# Patient Record
Sex: FEMALE | Race: WHITE | Employment: FULL TIME | URBAN - NONMETROPOLITAN AREA
[De-identification: names, ages, dates, MRNs, and addresses within clinical notes are randomized per-mention and may not be internally consistent; named-entity substitution may affect disease eponyms.]

---

## 2021-06-23 ENCOUNTER — TELEPHONE (OUTPATIENT)
Dept: OBGYN CLINIC | Age: 41
End: 2021-06-23

## 2021-06-28 RX ORDER — ACETAMINOPHEN AND CODEINE PHOSPHATE 120; 12 MG/5ML; MG/5ML
1 SOLUTION ORAL DAILY
Qty: 28 TABLET | Refills: 0 | Status: SHIPPED | OUTPATIENT
Start: 2021-06-28 | End: 2021-07-06

## 2021-07-06 ENCOUNTER — OFFICE VISIT (OUTPATIENT)
Dept: OBGYN CLINIC | Age: 41
End: 2021-07-06
Payer: COMMERCIAL

## 2021-07-06 VITALS
DIASTOLIC BLOOD PRESSURE: 64 MMHG | WEIGHT: 127.4 LBS | HEIGHT: 62 IN | BODY MASS INDEX: 23.45 KG/M2 | SYSTOLIC BLOOD PRESSURE: 97 MMHG

## 2021-07-06 DIAGNOSIS — Z01.411 ABNORMAL FEMALE PELVIC EXAM: Primary | ICD-10-CM

## 2021-07-06 DIAGNOSIS — N92.6 MENSTRUAL CHANGES: ICD-10-CM

## 2021-07-06 PROCEDURE — 99396 PREV VISIT EST AGE 40-64: CPT | Performed by: ADVANCED PRACTICE MIDWIFE

## 2021-07-06 RX ORDER — LEVOTHYROXINE SODIUM 75 UG/1
TABLET ORAL
COMMUNITY
Start: 2021-06-23

## 2021-07-06 RX ORDER — ACETAMINOPHEN AND CODEINE PHOSPHATE 120; 12 MG/5ML; MG/5ML
1 SOLUTION ORAL DAILY
Qty: 28 TABLET | Refills: 12 | Status: SHIPPED | OUTPATIENT
Start: 2021-07-06 | End: 2022-07-11

## 2021-07-06 ASSESSMENT — ENCOUNTER SYMPTOMS
CONSTIPATION: 0
SHORTNESS OF BREATH: 0
ABDOMINAL PAIN: 0
RESPIRATORY NEGATIVE: 1
GASTROINTESTINAL NEGATIVE: 1
DIARRHEA: 0

## 2021-07-06 NOTE — PROGRESS NOTES
YEARLY PHYSICAL    Date of service: 2021    Shelley Alonso  Is a 41 y.o.   female    PT's PCP is: No primary care provider on file. : 1980                                             Subjective:       Patient's last menstrual period was 2021 (exact date). Are your menses regular: Does not have menses with Shruti at end of pill pack (does have PMS type s/s) but daughter started menses approx 6 months ago (irregular cycles) and when daughter has menses patient has spotting. OB History    Para Term  AB Living   2         2   SAB TAB Ectopic Molar Multiple Live Births             2      # Outcome Date GA Lbr Perico/2nd Weight Sex Delivery Anes PTL Lv   2             1                  Social History     Tobacco Use   Smoking Status Former Smoker    Types: Cigarettes   Smokeless Tobacco Never Used        Social History     Substance and Sexual Activity   Alcohol Use Yes    Comment: rare       Family History   Problem Relation Age of Onset    Breast Cancer Paternal Grandmother     Hypertension Maternal Grandmother     Hypertension Mother        Any family history of breast or ovarian cancer:  Yes    Any family history of blood clots: No      Allergies: Azithromycin, Ibuprofen, Penicillins, and Sulfa antibiotics      Current Outpatient Medications:     EUTHYROX 75 MCG tablet, , Disp: , Rfl:     norethindrone (ORTHO MICRONOR) 0.35 MG tablet, Take 1 tablet by mouth daily, Disp: 28 tablet, Rfl: 0    Social History     Substance and Sexual Activity   Sexual Activity Yes    Partners: Male    Birth control/protection: OCP       Any bleeding or pain with intercourse: No    Last Yearly:      Last pap: 2019 - normal    Last HPV: 2019 - negative    Last Mammogram: 2020 - normal    Do you do self breast exams: Yes    Past Medical History:   Diagnosis Date    Abnormal Pap smear of cervix       Thyroid disease     hypothyroidism       History reviewed. No pertinent surgical history. Family History   Problem Relation Age of Onset    Breast Cancer Paternal Grandmother     Hypertension Maternal Grandmother     Hypertension Mother        Chief Complaint   Patient presents with    Annual Exam     Annual exam. Pap NL 1/2/19. Mammogram 9/24/20. Pt c/o having 2 periods per month. Pt would like refill of OCP,        Labs:    No results found for this visit on 07/06/21. HPI: Denies breast/pelvic concerns aside from menses changes. Reports that daughter started menses approx 6 months ago and has had a few cycles but when daughter bleeds the patient has vaginal bleeding but otherwise with [place] has no vaginal bleeding but has PMS s/s. Pap is UTD. Review of Systems   Constitutional: Negative. Negative for chills, fatigue and fever. HENT: Negative. Respiratory: Negative. Negative for shortness of breath. Cardiovascular: Negative. Negative for chest pain. Gastrointestinal: Negative. Negative for abdominal pain, constipation and diarrhea. Genitourinary: Negative for dysuria, enuresis, frequency, menstrual problem, pelvic pain, urgency and vaginal bleeding. Musculoskeletal: Negative. Neurological: Negative. Negative for dizziness, light-headedness and headaches. Psychiatric/Behavioral: Negative. Objective  Blood pressure 97/64, height 5' 2\" (1.575 m), weight 127 lb 6.4 oz (57.8 kg), last menstrual period 07/02/2021. Physical Exam  Constitutional:       Appearance: Normal appearance. She is normal weight. Genitourinary:      Pelvic exam was performed with patient in the lithotomy position. Vulva, urethra, vagina, cervix, uterus, right adnexa and left adnexa normal.      Uterus is anteverted and regular. HENT:      Head: Normocephalic. Eyes:      Pupils: Pupils are equal, round, and reactive to light.    Cardiovascular:      Rate and Rhythm: Normal rate and regular rhythm. Pulses: Normal pulses. Heart sounds: Normal heart sounds. No murmur heard. Pulmonary:      Effort: Pulmonary effort is normal.      Breath sounds: Normal breath sounds. No wheezing. Chest:      Breasts:         Right: Normal.         Left: Normal.   Abdominal:      Palpations: Abdomen is soft. Tenderness: There is no abdominal tenderness. Musculoskeletal:         General: Normal range of motion. Cervical back: Normal range of motion. No muscular tenderness. Neurological:      Mental Status: She is alert and oriented to person, place, and time. Skin:     General: Skin is warm and dry. Psychiatric:         Behavior: Behavior normal.   Vitals and nursing note reviewed. Chaperone present: Declined. Assessment and Plan          Diagnosis Orders   1. Abnormal female pelvic exam     2. Menstrual changes       Repeat Annual every 1 year  Cervical Cytology Evaluation begins at 24years old. If Negative Cytology, Follow-up screening per current guidelines. Mammograms every 1year. If 35 yo and last mammogram was negative. Calcium and Vitamin D dosing reviewed. Colonoscopy screening reviewed as well as onset for bone density testing. Birth control and barrier recommendationsdiscussed. STD counseling and prevention reviewed. Gardisil counseling completed for all patients. Routine healthmaintenance per patients PCP. Discussed bleeding changes with inez cycle - offered IUD to try to suppress menses but she declines. Denies all s/s of infection. Desires to continue Shruti        I am having Odella Curet maintain her norethindrone and Euthyrox. Return in about 1 year (around 7/6/2022) for Yearly. She was also counseled on her preventative health maintenance recommendations and follow-up. There are no Patient Instructions on file for this visit.     Armen 6, APRN - JOSIAHM,7/6/2021 1:19 PM

## 2022-07-11 ENCOUNTER — OFFICE VISIT (OUTPATIENT)
Dept: OBGYN CLINIC | Age: 42
End: 2022-07-11
Payer: COMMERCIAL

## 2022-07-11 ENCOUNTER — HOSPITAL ENCOUNTER (OUTPATIENT)
Age: 42
Setting detail: SPECIMEN
Discharge: HOME | End: 2022-07-11

## 2022-07-11 VITALS
DIASTOLIC BLOOD PRESSURE: 70 MMHG | HEIGHT: 62 IN | SYSTOLIC BLOOD PRESSURE: 104 MMHG | BODY MASS INDEX: 22.74 KG/M2 | WEIGHT: 123.6 LBS

## 2022-07-11 DIAGNOSIS — Z12.72 SMEAR, VAGINAL, AS PART OF ROUTINE GYNECOLOGICAL EXAMINATION: Primary | ICD-10-CM

## 2022-07-11 DIAGNOSIS — Z12.4 SCREENING FOR CERVICAL CANCER: ICD-10-CM

## 2022-07-11 DIAGNOSIS — Z01.419 SMEAR, VAGINAL, AS PART OF ROUTINE GYNECOLOGICAL EXAMINATION: Primary | ICD-10-CM

## 2022-07-11 PROCEDURE — 99396 PREV VISIT EST AGE 40-64: CPT | Performed by: ADVANCED PRACTICE MIDWIFE

## 2022-07-11 RX ORDER — TRAZODONE HYDROCHLORIDE 50 MG/1
TABLET ORAL
COMMUNITY
Start: 2022-07-01

## 2022-07-11 RX ORDER — ACETAMINOPHEN AND CODEINE PHOSPHATE 120; 12 MG/5ML; MG/5ML
1 SOLUTION ORAL DAILY
Qty: 28 TABLET | Refills: 12 | Status: SHIPPED | OUTPATIENT
Start: 2022-07-11

## 2022-07-11 ASSESSMENT — ENCOUNTER SYMPTOMS
RESPIRATORY NEGATIVE: 1
GASTROINTESTINAL NEGATIVE: 1
CONSTIPATION: 0
SHORTNESS OF BREATH: 0
DIARRHEA: 0
ABDOMINAL PAIN: 0

## 2022-07-11 NOTE — PROGRESS NOTES
YEARLY PHYSICAL    Date of service: 2022    Daina Amado  Is a 35 y.o.   female    PT's PCP is: No primary care provider on file. : 1980                                             Subjective:       No LMP recorded. (Menstrual status: Irregular periods). Are your menses regular: no    OB History    Para Term  AB Living   2         2   SAB IAB Ectopic Molar Multiple Live Births             2      # Outcome Date GA Lbr Perico/2nd Weight Sex Delivery Anes PTL Lv   2             1                  Social History     Tobacco Use   Smoking Status Former Smoker    Types: Cigarettes   Smokeless Tobacco Never Used        Social History     Substance and Sexual Activity   Alcohol Use Yes    Comment: rare       Family History   Problem Relation Age of Onset    Breast Cancer Paternal Grandmother     Hypertension Maternal Grandmother     Hypertension Mother        Any family history of breast or ovarian cancer: Yes    Any family history of blood clots: No      Allergies: Azithromycin, Ibuprofen, Penicillins, and Sulfa antibiotics      Current Outpatient Medications:     traZODone (DESYREL) 50 MG tablet, , Disp: , Rfl:     norethindrone (ORTHO MICRONOR) 0.35 MG tablet, Take 1 tablet by mouth daily, Disp: 28 tablet, Rfl: 12    EUTHYROX 75 MCG tablet, , Disp: , Rfl:     Social History     Substance and Sexual Activity   Sexual Activity Yes    Partners: Male    Birth control/protection: OCP       Any bleeding or pain with intercourse: No    Last Yearly:      Last pap: Due    Last Mammogram: 2021 - normal    Do you do self breast exams: Encouraged    Past Medical History:   Diagnosis Date    Abnormal Pap smear of cervix     2003    Thyroid disease     hypothyroidism       History reviewed. No pertinent surgical history.     Family History   Problem Relation Age of Onset    Breast Cancer Paternal Grandmother     Hypertension Maternal Grandmother     Hypertension Mother        Chief Complaint   Patient presents with    Annual Exam     Pap due. Mammogram 9/28/21. Pt denies concerns. Pt would like refill of OCP. Pt would like to discuss hermone therap for menopause. Labs:    No results found for this visit on 07/11/22. HPI: Annual.  Denies breast/pelvic concerns. POP - has irreg cycles. Monogamous relationship. Pap is due. Mammogram is UTD. Would like to discuss when to start HRT - has friends who use it. Review of Systems   Constitutional: Negative. Negative for chills, fatigue and fever. HENT: Negative. Respiratory: Negative. Negative for shortness of breath. Cardiovascular: Negative. Negative for chest pain. Gastrointestinal: Negative. Negative for abdominal pain, constipation and diarrhea. Genitourinary: Positive for menstrual problem (  Irreg). Negative for dysuria, enuresis, frequency, pelvic pain, urgency and vaginal bleeding. Musculoskeletal: Negative. Neurological: Negative. Negative for dizziness, light-headedness and headaches. Psychiatric/Behavioral: Negative. Objective  Blood pressure 104/70, height 5' 2\" (1.575 m), weight 123 lb 9.6 oz (56.1 kg). Physical Exam  Constitutional:       Appearance: Normal appearance. She is normal weight. Genitourinary:      Vulva, bladder and urethral meatus normal.      No vaginal discharge or tenderness. No vaginal prolapse present. Right Adnexa: not tender. Left Adnexa: not tender. No cervical motion tenderness or discharge. Uterus is not tender. Pelvic exam was performed with patient in the lithotomy position. Breasts: Breasts are symmetrical.      Breasts are soft. Right: No mass, nipple discharge, skin change or tenderness. Left: No mass, nipple discharge, skin change or tenderness. HENT:      Head: Normocephalic.    Eyes:      Pupils: Pupils are equal, round, and reactive to light. Cardiovascular:      Rate and Rhythm: Normal rate and regular rhythm. Pulses: Normal pulses. Heart sounds: Normal heart sounds. No murmur heard. Pulmonary:      Effort: Pulmonary effort is normal.      Breath sounds: Normal breath sounds. No wheezing. Abdominal:      Palpations: Abdomen is soft. Tenderness: There is no abdominal tenderness. Musculoskeletal:         General: Normal range of motion. Cervical back: Normal range of motion. No muscular tenderness. Neurological:      Mental Status: She is alert and oriented to person, place, and time. Skin:     General: Skin is warm and dry. Psychiatric:         Behavior: Behavior normal.   Vitals and nursing note reviewed. Chaperone present: Declined. Assessment and Plan          Diagnosis Orders   1. Smear, vaginal, as part of routine gynecological examination     2. Screening for cervical cancer  PAP SMEAR        Repeat Annual every 1 year  Cervical Cytology Evaluation begins at 24years old. If Negative Cytology, Follow-up screening per current guidelines. Mammograms every 1year. If 37 yo and last mammogram was negative. Calcium and Vitamin D dosing reviewed. Birth control and barrier recommendationsdiscussed. STD counseling and prevention reviewed. Routine healthmaintenance per patients PCP. Discussed HRT - indications for this, short time use, lowest dose use      I am having Ky Westbrook start on norethindrone. I am also having her maintain her Euthyrox and traZODone. Return in about 1 year (around 7/11/2023) for Yearly. She was also counseled on her preventative health maintenance recommendations and follow-up. There are no Patient Instructions on file for this visit.     ANJU Mireles CNM,7/11/2022 10:55 AM

## 2022-07-13 LAB
HPV SAMPLE: NORMAL
HPV, GENOTYPE 16: NOT DETECTED
HPV, GENOTYPE 18: NOT DETECTED
HPV, HIGH RISK OTHER: NOT DETECTED
HPV, INTERPRETATION: NORMAL
SPECIMEN DESCRIPTION: NORMAL

## 2022-07-14 LAB — CYTOLOGY REPORT: NORMAL

## 2022-11-03 ENCOUNTER — OFFICE VISIT (OUTPATIENT)
Dept: OBGYN CLINIC | Age: 42
End: 2022-11-03
Payer: COMMERCIAL

## 2022-11-03 VITALS
BODY MASS INDEX: 22.86 KG/M2 | DIASTOLIC BLOOD PRESSURE: 64 MMHG | SYSTOLIC BLOOD PRESSURE: 110 MMHG | WEIGHT: 124.2 LBS | HEIGHT: 62 IN

## 2022-11-03 DIAGNOSIS — N92.6 IRREGULAR MENSES: Primary | ICD-10-CM

## 2022-11-03 DIAGNOSIS — N94.6 DYSMENORRHEA: ICD-10-CM

## 2022-11-03 DIAGNOSIS — N92.1 MENORRHAGIA WITH IRREGULAR CYCLE: ICD-10-CM

## 2022-11-03 PROCEDURE — 99214 OFFICE O/P EST MOD 30 MIN: CPT | Performed by: ADVANCED PRACTICE MIDWIFE

## 2022-11-03 ASSESSMENT — ENCOUNTER SYMPTOMS
RESPIRATORY NEGATIVE: 1
GASTROINTESTINAL NEGATIVE: 1

## 2022-11-03 NOTE — PROGRESS NOTES
control/protection: OCP       Chief Complaint   Patient presents with    Irregular Menses     C/o irregular cycles that are heavy. Pt is on OCP and never has cycles when on placebo week. Previous cycles were Oct 3-10, Aug 30-Sep 5th, and July 15-24. Also has bad cramps and very fatigue. PCP told her has anemia and thinks may be related to cycle. Pt states she has gushing at times the is very heavy. Physical Exam  Constitutional:       Appearance: Normal appearance. She is normal weight. HENT:      Head: Normocephalic. Eyes:      Pupils: Pupils are equal, round, and reactive to light. Pulmonary:      Effort: Pulmonary effort is normal.   Musculoskeletal:         General: Normal range of motion. Cervical back: Normal range of motion. Neurological:      Mental Status: She is alert and oriented to person, place, and time. Skin:     General: Skin is warm and dry. Psychiatric:         Behavior: Behavior normal.   Vitals and nursing note reviewed. Vital Signs  Blood pressure 110/64, height 5' 2\" (1.575 m), weight 124 lb 3.2 oz (56.3 kg), last menstrual period 10/03/2022. Results reviewed today:    Reviewed all labs done 10/18/2022 as ordered by PCP. Stable labs - Hgb very slightly decreased from lab reference range                              Assessment and Plan          Diagnosis Orders   1. Irregular menses        2. Dysmenorrhea        3. Menorrhagia with irregular cycle          Discussed options with patient - discussed initial evaluation is to call cycle day 1 to schedule a day 7-9 pelvic Usn and f/u with provider. Discussed options for mgmt of AUB - IUD insertion, ablation, or hyst.  Patient given ablation handout    Spent >50% face to face with this patient - evaluation of her s/s, review of lab results, discussion of work up, possible causes (including but not limited to fibroids, polyp, adenomyosis, thickened lining), and treatment options.   Time on this patient 32 minutes. I am having Michelle Thompsont maintain her Euthyrox, traZODone, and norethindrone. Return call cycle day 1. She was also counseled on her preventative health maintenance recommendations and follow-up. There are no Patient Instructions on file for this visit.     ANJU Johansen CNM,11/3/2022 10:25 AM                 Electronically signed by ANJU Johansen CNM

## 2022-11-10 ENCOUNTER — TELEPHONE (OUTPATIENT)
Dept: OBGYN CLINIC | Age: 42
End: 2022-11-10

## 2022-11-10 DIAGNOSIS — N94.6 DYSMENORRHEA: ICD-10-CM

## 2022-11-10 DIAGNOSIS — N92.1 MENORRHAGIA WITH IRREGULAR CYCLE: ICD-10-CM

## 2022-11-10 DIAGNOSIS — N92.6 IRREGULAR MENSES: Primary | ICD-10-CM

## 2022-11-10 NOTE — TELEPHONE ENCOUNTER
Pt is requesting the order for usn be put in to be done at [facility] ([facility]) in [facility] on [address] after 2:30 pm. She will schedule to see BR the following week. Also, pt wanted to make sure we are aware she is allergic to ibuprofen.

## 2022-11-10 NOTE — TELEPHONE ENCOUNTER
Pt was to call on cycle day one for usn and fu, however it lands on 11-14. She is ok with waiting until next cycle but is currently in very much pain with this cycle. Please call pt with recommendations.

## 2022-11-10 NOTE — TELEPHONE ENCOUNTER
So what was first day of cycle? I assume that day 7-9 falls on 11/14? If so we can do the USN and then a f/u the following week with me. Or she can just wait until December visit.   Tylenol/Motrin for the pain but if severe then ER

## 2022-11-14 ENCOUNTER — HOSPITAL ENCOUNTER (OUTPATIENT)
Dept: ULTRASOUND IMAGING | Age: 42
Discharge: HOME | End: 2022-11-16
Payer: COMMERCIAL

## 2022-11-14 DIAGNOSIS — N92.6 IRREGULAR MENSES: ICD-10-CM

## 2022-11-14 DIAGNOSIS — N92.1 MENORRHAGIA WITH IRREGULAR CYCLE: ICD-10-CM

## 2022-11-14 DIAGNOSIS — N94.6 DYSMENORRHEA: ICD-10-CM

## 2022-11-14 PROCEDURE — 76830 TRANSVAGINAL US NON-OB: CPT

## 2022-11-14 PROCEDURE — 76856 US EXAM PELVIC COMPLETE: CPT

## 2022-11-22 ENCOUNTER — OFFICE VISIT (OUTPATIENT)
Dept: OBGYN CLINIC | Age: 42
End: 2022-11-22
Payer: COMMERCIAL

## 2022-11-22 ENCOUNTER — TELEPHONE (OUTPATIENT)
Dept: OBGYN CLINIC | Age: 42
End: 2022-11-22

## 2022-11-22 VITALS
BODY MASS INDEX: 23.34 KG/M2 | HEIGHT: 62 IN | WEIGHT: 126.8 LBS | SYSTOLIC BLOOD PRESSURE: 110 MMHG | DIASTOLIC BLOOD PRESSURE: 62 MMHG

## 2022-11-22 DIAGNOSIS — N94.6 DYSMENORRHEA: Primary | ICD-10-CM

## 2022-11-22 DIAGNOSIS — N92.6 MENSTRUAL CHANGES: ICD-10-CM

## 2022-11-22 DIAGNOSIS — N92.1 MENORRHAGIA WITH IRREGULAR CYCLE: ICD-10-CM

## 2022-11-22 DIAGNOSIS — Z01.818 PRE-OP TESTING: Primary | ICD-10-CM

## 2022-11-22 DIAGNOSIS — N94.6 DYSMENORRHEA: ICD-10-CM

## 2022-11-22 PROCEDURE — 99213 OFFICE O/P EST LOW 20 MIN: CPT | Performed by: ADVANCED PRACTICE MIDWIFE

## 2022-11-22 ASSESSMENT — ENCOUNTER SYMPTOMS
RESPIRATORY NEGATIVE: 1
GASTROINTESTINAL NEGATIVE: 1

## 2022-11-22 NOTE — TELEPHONE ENCOUNTER
Desires ablation - aware you will call to schedule.   If going to be a while can use Aygestin for heavy bleeding mgmt

## 2022-11-22 NOTE — PROGRESS NOTES
PROBLEM VISIT     Date of service: 2022    Solomon Chatterjee  Is a 38 y.o.  female    PT's PCP is: Jai Sam MD     : 1980                                          HPI Here for USN f/u. Usn was done in [place] for patient conveinence. Most recent cycle - less cramping, very heavy bleeding, bled 8-9 days    Review of Systems   Constitutional: Negative. HENT: Negative. Respiratory: Negative. Gastrointestinal: Negative. Genitourinary:  Positive for menstrual problem. Neurological: Negative. Psychiatric/Behavioral: Negative. No LMP recorded. (Menstrual status: Irregular periods). OB History    Para Term  AB Living   2         2   SAB IAB Ectopic Molar Multiple Live Births             2      # Outcome Date GA Lbr Perico/2nd Weight Sex Delivery Anes PTL Lv   2             1                  Social History     Tobacco Use   Smoking Status Former    Types: Cigarettes   Smokeless Tobacco Never        Social History     Substance and Sexual Activity   Alcohol Use Yes    Comment: rare       Allergies: Azithromycin, Ibuprofen, Penicillins, and Sulfa antibiotics      Current Outpatient Medications:     traZODone (DESYREL) 50 MG tablet, , Disp: , Rfl:     norethindrone (ORTHO MICRONOR) 0.35 MG tablet, Take 1 tablet by mouth daily, Disp: 28 tablet, Rfl: 12    EUTHYROX 75 MCG tablet, , Disp: , Rfl:     Social History     Substance and Sexual Activity   Sexual Activity Yes    Partners: Male    Birth control/protection: OCP       Chief Complaint   Patient presents with    Irregular Menses     Pt here from [address] [place] [address] today. Pt has USN done in [place]. Denies any new concerns. Physical Exam  Constitutional:       Appearance: Normal appearance. She is normal weight. HENT:      Head: Normocephalic. Eyes:      Pupils: Pupils are equal, round, and reactive to light.    Pulmonary:      Effort: Pulmonary effort is normal.   Musculoskeletal:         General: Normal range of motion. Cervical back: Normal range of motion. Neurological:      Mental Status: She is alert and oriented to person, place, and time. Skin:     General: Skin is warm and dry. Psychiatric:         Behavior: Behavior normal.   Vitals and nursing note reviewed. Vital Signs  Blood pressure 110/62, height 5' 2\" (1.575 m), weight 126 lb 12.8 oz (57.5 kg). Results reviewed today:    USN reviewed  Uterus 8.8 x 4.5 x 4.9cm  Anteverted uterus  Endometrium 6mm  Right ovary and left ovary normal  Suspected fundal fibroid 2.2cm at greatest dimension                              Assessment and Plan          Diagnosis Orders   1. Dysmenorrhea        2. Menstrual changes            Discussed options - would like to proceed with ablation - if great deal of time prior to surgery can use aygestin for heavy bleeding. She is aware Bryn Sandoval will call her to schedule      I am having Rory North maintain her Euthyrox, traZODone, and norethindrone. Return if symptoms worsen or fail to improve, for Yearly. She was also counseled on her preventative health maintenance recommendations and follow-up. There are no Patient Instructions on file for this visit.     ANJU Andrade CNM,11/22/2022 3:23 PM                   Electronically signed by ANJU Andrade CNM

## 2022-12-02 NOTE — TELEPHONE ENCOUNTER
I spoke to patient regarding surgery expectations and recovery. She is aware that we will also do a Laparoscopic Bilateral Salpingectomy with her procedure as she needs a form of permanent sterilization with an ablation. She is scheduled for a Laparoscopic Bilateral Salpingectomy, Hysteroscopy Dilation and Curettage with a Novasure endometrial ablation at [facility] on 3/10/2023. She is aware that a nurse from [facility] will call her to complete a phone interview and arrange COVID testing if needed. Patient did have the COVID vaccine. She denies needing a note for work. Patient will come in to see Dr. Vin Menjivar prior to surgery and will get labs at that visit. We will also follow up 12 weeks after surgery. Appointments scheduled. Patient verbalized understanding, no further questions/concerns voiced.

## 2022-12-20 ENCOUNTER — TELEPHONE (OUTPATIENT)
Dept: OBGYN CLINIC | Age: 42
End: 2022-12-20

## 2022-12-20 NOTE — TELEPHONE ENCOUNTER
Paperwork faxed to [address] [place] [address] to initiate a PA for patient's upcoming surgery.

## 2023-02-28 RX ORDER — LEVOTHYROXINE SODIUM 0.07 MG/1
75 TABLET ORAL DAILY
COMMUNITY

## 2023-02-28 NOTE — PROGRESS NOTES
Patient instructed on the pre-operative, intra-operative, and post-operative process. Patient instructed on NPO status. Medication instructions and pre operative instruction sheet reviewed with the patient. CHG skin prep instructions reviewed with patient. Instructed pt to take synthroid with a small sip of water prior to arriving to the hospital the day of surgery.

## 2023-03-08 ENCOUNTER — HOSPITAL ENCOUNTER (OUTPATIENT)
Age: 43
Setting detail: SPECIMEN
Discharge: HOME | End: 2023-03-08

## 2023-03-08 ENCOUNTER — OFFICE VISIT (OUTPATIENT)
Dept: OBGYN CLINIC | Age: 43
End: 2023-03-08
Payer: COMMERCIAL

## 2023-03-08 VITALS — WEIGHT: 128 LBS | DIASTOLIC BLOOD PRESSURE: 62 MMHG | SYSTOLIC BLOOD PRESSURE: 104 MMHG | BODY MASS INDEX: 23.41 KG/M2

## 2023-03-08 DIAGNOSIS — N92.1 MENORRHAGIA WITH IRREGULAR CYCLE: ICD-10-CM

## 2023-03-08 DIAGNOSIS — N92.1 MENORRHAGIA WITH IRREGULAR CYCLE: Primary | ICD-10-CM

## 2023-03-08 DIAGNOSIS — N94.6 DYSMENORRHEA: ICD-10-CM

## 2023-03-08 DIAGNOSIS — Z01.818 PRE-OP TESTING: ICD-10-CM

## 2023-03-08 LAB
ABSOLUTE EOS #: 0.23 K/UL (ref 0–0.44)
ABSOLUTE IMMATURE GRANULOCYTE: <0.03 K/UL (ref 0–0.3)
ABSOLUTE LYMPH #: 2.38 K/UL (ref 1.1–3.7)
ABSOLUTE MONO #: 0.49 K/UL (ref 0.1–1.2)
BASOPHILS # BLD: 1 % (ref 0–2)
BASOPHILS ABSOLUTE: 0.07 K/UL (ref 0–0.2)
EOSINOPHILS RELATIVE PERCENT: 4 % (ref 1–4)
HCG QUANTITATIVE: <1 MIU/ML
HCT VFR BLD AUTO: 38.8 % (ref 36.3–47.1)
HGB BLD-MCNC: 12.4 G/DL (ref 11.9–15.1)
IMMATURE GRANULOCYTES: 0 %
LYMPHOCYTES # BLD: 37 % (ref 24–43)
MCH RBC QN AUTO: 30.2 PG (ref 25.2–33.5)
MCHC RBC AUTO-ENTMCNC: 32 G/DL (ref 28.4–34.8)
MCV RBC AUTO: 94.6 FL (ref 82.6–102.9)
MONOCYTES # BLD: 8 % (ref 3–12)
NRBC AUTOMATED: 0 PER 100 WBC
PDW BLD-RTO: 12.6 % (ref 11.8–14.4)
PLATELET # BLD AUTO: 275 K/UL (ref 138–453)
PMV BLD AUTO: 10.4 FL (ref 8.1–13.5)
RBC # BLD: 4.1 M/UL (ref 3.95–5.11)
SEG NEUTROPHILS: 50 % (ref 36–65)
SEGMENTED NEUTROPHILS ABSOLUTE COUNT: 3.25 K/UL (ref 1.5–8.1)
WBC # BLD AUTO: 6.4 K/UL (ref 3.5–11.3)

## 2023-03-08 PROCEDURE — 99213 OFFICE O/P EST LOW 20 MIN: CPT | Performed by: OBSTETRICS & GYNECOLOGY

## 2023-03-08 RX ORDER — UBROGEPANT 100 MG/1
TABLET ORAL
COMMUNITY
Start: 2023-01-13

## 2023-03-08 NOTE — PROGRESS NOTES
DATE OF VISIT:  3/8/23    PATIENT NAME:  Sonam Quiroga     YOB: 1980    REASON FOR VISIT:    Chief Complaint   Patient presents with    Pre-op Exam     Patient is scheduled on 3- for lap bilateral salpingectomy, hysteroscopy D&C, Zoran. Irregular Menses     Cycles getting longer, heavier, and having more cramping. For awhile she was having a period every 2 weeks. That aren't happening that frequently recently but her last cycle lasted 9 days. Menorrhagia     Patient reports that she has bled through her clothes on multiple occasions. After sitting she will stand up an have a gush that goes through her pad and pants. She will wear  a tampon and a pad and have to change every few hours. Dysmenorrhea     Patient reports cramping with her cycles. At times it doubles her over and she has to [place] her way out of it\" to be able to function. Cramping is usually intense just first 2 days of a period. HISTORY OF PRESENT ILLNESS:  Pt with heavy and irregular cylces; has had flooding episodes where she has bled through her clothes; dysmenorrhea that doubles her over; completed family status; 8.8 cm uterus on usn; pt interested in surgical intervention; disc'd hysteroscopy d&c zoran with bilateral salpingectomy; r/b/a reviewed; restrictions and recovery disc'd      Patient's last menstrual period was 02/19/2023 (exact date). Vitals:    03/08/23 0858   BP: 104/62   Site: Right Upper Arm   Position: Sitting   Weight: 128 lb (58.1 kg)     Body mass index is 23.41 kg/m². Allergies   Allergen Reactions    Azithromycin     Ibuprofen     Penicillins     Sulfa Antibiotics     Nickel Rash     Current Outpatient Medications   Medication Sig Dispense Refill    UBRELVY 100 MG TABS TAKE ONE TABLET BY MOUTH AT ONSET OF MIGRAINE. MAY REPEAT IN ONE HOUR. MAX TWO TABLETS PER DAY.       MULTIPLE VITAMINS PO Take by mouth      levothyroxine (SYNTHROID) 75 MCG tablet Take 75 mcg by mouth Daily      traZODone (DESYREL) 50 MG tablet as needed      norethindrone (ORTHO MICRONOR) 0.35 MG tablet Take 1 tablet by mouth daily 28 tablet 12     No current facility-administered medications for this visit. Social History     Socioeconomic History    Marital status:    Tobacco Use    Smoking status: Former     Types: Cigarettes    Smokeless tobacco: Never   Vaping Use    Vaping Use: Never used   Substance and Sexual Activity    Alcohol use: Yes     Comment: rare    Drug use: Never    Sexual activity: Yes     Partners: Male     Birth control/protection: OCP       REVIEW OF SYSTEMS:  Review of Systems   Constitutional:  Negative for chills and fever. Genitourinary:  Positive for menstrual problem and pelvic pain. Negative for dysuria and vaginal discharge. PHYSICAL EXAM:  /62 (Site: Right Upper Arm, Position: Sitting)   Wt 128 lb (58.1 kg)   LMP 02/19/2023 (Exact Date)   BMI 23.41 kg/m²   Physical Exam  Constitutional:       Appearance: Normal appearance. HENT:      Head: Normocephalic and atraumatic. Eyes:      Extraocular Movements: Extraocular movements intact. Pupils: Pupils are equal, round, and reactive to light. Cardiovascular:      Rate and Rhythm: Normal rate. Pulmonary:      Effort: Pulmonary effort is normal.   Musculoskeletal:         General: Normal range of motion. Neurological:      Mental Status: She is alert and oriented to person, place, and time. Skin:     General: Skin is warm and dry. Psychiatric:         Mood and Affect: Mood normal.         Behavior: Behavior normal.     The patient, Bismark Vaughan is a 38 y.o. female, was seen with a total time spent of 20 minutes for the visit on this date of service by the E/M provider. The time component had both face to face and non face to face time spent in determining the total time component.   Counseling and education regarding her diagnosis listed below and her options regarding those diagnoses were also included in determining her time component. Diagnosis Orders   1. Menorrhagia with irregular cycle        2. Dysmenorrhea             The patient had her preventative health maintenance recommendations and follow-up reviewed with her at the completion of her visit. ASSESSMENT:      1. Menorrhagia with irregular cycle    2. Dysmenorrhea        PLAN:  No orders of the defined types were placed in this encounter. Return in about 2 days (around 3/10/2023) for hysteroscopy d&c keagan with bilateral salpingectomy.        Electronically signed by Garland Harrington DO on 03/08/23

## 2023-03-09 ENCOUNTER — ANESTHESIA EVENT (OUTPATIENT)
Dept: OPERATING ROOM | Age: 43
End: 2023-03-09
Payer: COMMERCIAL

## 2023-03-10 ENCOUNTER — HOSPITAL ENCOUNTER (OUTPATIENT)
Age: 43
Setting detail: OUTPATIENT SURGERY
Discharge: HOME | End: 2023-03-10
Attending: OBSTETRICS & GYNECOLOGY | Admitting: OBSTETRICS & GYNECOLOGY
Payer: COMMERCIAL

## 2023-03-10 ENCOUNTER — ANESTHESIA (OUTPATIENT)
Dept: OPERATING ROOM | Age: 43
End: 2023-03-10
Payer: COMMERCIAL

## 2023-03-10 VITALS
RESPIRATION RATE: 16 BRPM | HEART RATE: 62 BPM | OXYGEN SATURATION: 100 % | DIASTOLIC BLOOD PRESSURE: 70 MMHG | WEIGHT: 125 LBS | BODY MASS INDEX: 23 KG/M2 | SYSTOLIC BLOOD PRESSURE: 131 MMHG | TEMPERATURE: 97.3 F | HEIGHT: 62 IN

## 2023-03-10 DIAGNOSIS — G89.18 POST-OP PAIN: Primary | ICD-10-CM

## 2023-03-10 DIAGNOSIS — N92.0 MENORRHAGIA WITH REGULAR CYCLE: ICD-10-CM

## 2023-03-10 PROBLEM — N92.1 MENORRHAGIA WITH IRREGULAR CYCLE: Status: ACTIVE | Noted: 2023-03-10

## 2023-03-10 PROCEDURE — 64488 TAP BLOCK BI INJECTION: CPT | Performed by: NURSE ANESTHETIST, CERTIFIED REGISTERED

## 2023-03-10 PROCEDURE — 3600000004 HC SURGERY LEVEL 4 BASE: Performed by: OBSTETRICS & GYNECOLOGY

## 2023-03-10 PROCEDURE — 6360000002 HC RX W HCPCS

## 2023-03-10 PROCEDURE — A4216 STERILE WATER/SALINE, 10 ML: HCPCS

## 2023-03-10 PROCEDURE — 2580000003 HC RX 258

## 2023-03-10 PROCEDURE — 7100000011 HC PHASE II RECOVERY - ADDTL 15 MIN: Performed by: OBSTETRICS & GYNECOLOGY

## 2023-03-10 PROCEDURE — 6360000002 HC RX W HCPCS: Performed by: NURSE ANESTHETIST, CERTIFIED REGISTERED

## 2023-03-10 PROCEDURE — 2500000003 HC RX 250 WO HCPCS: Performed by: NURSE ANESTHETIST, CERTIFIED REGISTERED

## 2023-03-10 PROCEDURE — 7100000010 HC PHASE II RECOVERY - FIRST 15 MIN: Performed by: OBSTETRICS & GYNECOLOGY

## 2023-03-10 PROCEDURE — 58661 LAPAROSCOPY REMOVE ADNEXA: CPT | Performed by: OBSTETRICS & GYNECOLOGY

## 2023-03-10 PROCEDURE — 2720000010 HC SURG SUPPLY STERILE: Performed by: OBSTETRICS & GYNECOLOGY

## 2023-03-10 PROCEDURE — 58563 HYSTEROSCOPY ABLATION: CPT | Performed by: OBSTETRICS & GYNECOLOGY

## 2023-03-10 PROCEDURE — 3700000000 HC ANESTHESIA ATTENDED CARE: Performed by: OBSTETRICS & GYNECOLOGY

## 2023-03-10 PROCEDURE — 7100000001 HC PACU RECOVERY - ADDTL 15 MIN: Performed by: OBSTETRICS & GYNECOLOGY

## 2023-03-10 PROCEDURE — 6370000000 HC RX 637 (ALT 250 FOR IP): Performed by: NURSE ANESTHETIST, CERTIFIED REGISTERED

## 2023-03-10 PROCEDURE — 88302 TISSUE EXAM BY PATHOLOGIST: CPT

## 2023-03-10 PROCEDURE — 3600000014 HC SURGERY LEVEL 4 ADDTL 15MIN: Performed by: OBSTETRICS & GYNECOLOGY

## 2023-03-10 PROCEDURE — 2580000003 HC RX 258: Performed by: NURSE ANESTHETIST, CERTIFIED REGISTERED

## 2023-03-10 PROCEDURE — 3700000001 HC ADD 15 MINUTES (ANESTHESIA): Performed by: OBSTETRICS & GYNECOLOGY

## 2023-03-10 PROCEDURE — 2709999900 HC NON-CHARGEABLE SUPPLY: Performed by: OBSTETRICS & GYNECOLOGY

## 2023-03-10 PROCEDURE — 88305 TISSUE EXAM BY PATHOLOGIST: CPT

## 2023-03-10 PROCEDURE — 7100000000 HC PACU RECOVERY - FIRST 15 MIN: Performed by: OBSTETRICS & GYNECOLOGY

## 2023-03-10 RX ORDER — PYRAZINAMIDE 500 MG/1
1 TABLET ORAL EVERY 6 HOURS PRN
Qty: 10 TABLET | Refills: 0 | Status: SHIPPED | OUTPATIENT
Start: 2023-03-10 | End: 2023-03-15

## 2023-03-10 RX ORDER — DEXAMETHASONE SODIUM PHOSPHATE 4 MG/ML
INJECTION, SOLUTION INTRA-ARTICULAR; INTRALESIONAL; INTRAMUSCULAR; INTRAVENOUS; SOFT TISSUE PRN
Status: DISCONTINUED | OUTPATIENT
Start: 2023-03-10 | End: 2023-03-10 | Stop reason: SDUPTHER

## 2023-03-10 RX ORDER — PROPOFOL 10 MG/ML
INJECTION, EMULSION INTRAVENOUS PRN
Status: DISCONTINUED | OUTPATIENT
Start: 2023-03-10 | End: 2023-03-10 | Stop reason: SDUPTHER

## 2023-03-10 RX ORDER — DEXAMETHASONE SODIUM PHOSPHATE 10 MG/ML
INJECTION, SOLUTION INTRAMUSCULAR; INTRAVENOUS PRN
Status: DISCONTINUED | OUTPATIENT
Start: 2023-03-10 | End: 2023-03-10 | Stop reason: SDUPTHER

## 2023-03-10 RX ORDER — FENTANYL CITRATE 50 UG/ML
INJECTION, SOLUTION INTRAMUSCULAR; INTRAVENOUS PRN
Status: DISCONTINUED | OUTPATIENT
Start: 2023-03-10 | End: 2023-03-10 | Stop reason: SDUPTHER

## 2023-03-10 RX ORDER — SODIUM CHLORIDE 9 MG/ML
INJECTION, SOLUTION INTRAVENOUS PRN
Status: DISCONTINUED | OUTPATIENT
Start: 2023-03-10 | End: 2023-03-10 | Stop reason: HOSPADM

## 2023-03-10 RX ORDER — NEOSTIGMINE METHYLSULFATE 1 MG/ML
INJECTION, SOLUTION INTRAVENOUS PRN
Status: DISCONTINUED | OUTPATIENT
Start: 2023-03-10 | End: 2023-03-10 | Stop reason: SDUPTHER

## 2023-03-10 RX ORDER — MIDAZOLAM HYDROCHLORIDE 1 MG/ML
INJECTION INTRAMUSCULAR; INTRAVENOUS PRN
Status: DISCONTINUED | OUTPATIENT
Start: 2023-03-10 | End: 2023-03-10 | Stop reason: SDUPTHER

## 2023-03-10 RX ORDER — HYDROCODONE BITARTRATE AND ACETAMINOPHEN 5; 325 MG/1; MG/1
1 TABLET ORAL EVERY 6 HOURS PRN
Status: DISCONTINUED | OUTPATIENT
Start: 2023-03-10 | End: 2023-03-10 | Stop reason: HOSPADM

## 2023-03-10 RX ORDER — METOCLOPRAMIDE HYDROCHLORIDE 5 MG/ML
5 INJECTION INTRAMUSCULAR; INTRAVENOUS
Status: COMPLETED | OUTPATIENT
Start: 2023-03-10 | End: 2023-03-10

## 2023-03-10 RX ORDER — SODIUM CHLORIDE 9 MG/ML
INJECTION INTRAVENOUS PRN
Status: DISCONTINUED | OUTPATIENT
Start: 2023-03-10 | End: 2023-03-10 | Stop reason: SDUPTHER

## 2023-03-10 RX ORDER — ROPIVACAINE HYDROCHLORIDE 5 MG/ML
INJECTION, SOLUTION EPIDURAL; INFILTRATION; PERINEURAL PRN
Status: DISCONTINUED | OUTPATIENT
Start: 2023-03-10 | End: 2023-03-10

## 2023-03-10 RX ORDER — DIMENHYDRINATE 50 MG/1
50 TABLET ORAL ONCE
Status: COMPLETED | OUTPATIENT
Start: 2023-03-10 | End: 2023-03-10

## 2023-03-10 RX ORDER — ONDANSETRON 2 MG/ML
INJECTION INTRAMUSCULAR; INTRAVENOUS PRN
Status: DISCONTINUED | OUTPATIENT
Start: 2023-03-10 | End: 2023-03-10 | Stop reason: SDUPTHER

## 2023-03-10 RX ORDER — ROCURONIUM BROMIDE 10 MG/ML
INJECTION, SOLUTION INTRAVENOUS PRN
Status: DISCONTINUED | OUTPATIENT
Start: 2023-03-10 | End: 2023-03-10 | Stop reason: SDUPTHER

## 2023-03-10 RX ORDER — ACETAMINOPHEN 325 MG/1
650 TABLET ORAL ONCE
Status: COMPLETED | OUTPATIENT
Start: 2023-03-10 | End: 2023-03-10

## 2023-03-10 RX ORDER — FENTANYL CITRATE 50 UG/ML
50 INJECTION, SOLUTION INTRAMUSCULAR; INTRAVENOUS EVERY 5 MIN PRN
Status: DISCONTINUED | OUTPATIENT
Start: 2023-03-10 | End: 2023-03-10 | Stop reason: HOSPADM

## 2023-03-10 RX ORDER — SODIUM CHLORIDE 0.9 % (FLUSH) 0.9 %
5-40 SYRINGE (ML) INJECTION PRN
Status: DISCONTINUED | OUTPATIENT
Start: 2023-03-10 | End: 2023-03-10 | Stop reason: HOSPADM

## 2023-03-10 RX ORDER — SODIUM CHLORIDE 0.9 % (FLUSH) 0.9 %
5-40 SYRINGE (ML) INJECTION EVERY 12 HOURS SCHEDULED
Status: DISCONTINUED | OUTPATIENT
Start: 2023-03-10 | End: 2023-03-10 | Stop reason: HOSPADM

## 2023-03-10 RX ORDER — ROPIVACAINE HYDROCHLORIDE 5 MG/ML
INJECTION, SOLUTION EPIDURAL; INFILTRATION; PERINEURAL PRN
Status: DISCONTINUED | OUTPATIENT
Start: 2023-03-10 | End: 2023-03-10 | Stop reason: SDUPTHER

## 2023-03-10 RX ORDER — LIDOCAINE HYDROCHLORIDE 20 MG/ML
INJECTION, SOLUTION EPIDURAL; INFILTRATION; INTRACAUDAL; PERINEURAL PRN
Status: DISCONTINUED | OUTPATIENT
Start: 2023-03-10 | End: 2023-03-10 | Stop reason: SDUPTHER

## 2023-03-10 RX ORDER — SODIUM CHLORIDE, SODIUM LACTATE, POTASSIUM CHLORIDE, CALCIUM CHLORIDE 600; 310; 30; 20 MG/100ML; MG/100ML; MG/100ML; MG/100ML
INJECTION, SOLUTION INTRAVENOUS CONTINUOUS
Status: DISCONTINUED | OUTPATIENT
Start: 2023-03-10 | End: 2023-03-10 | Stop reason: HOSPADM

## 2023-03-10 RX ORDER — CEFAZOLIN SODIUM IN 0.9 % NACL 2 G/100 ML
2000 PLASTIC BAG, INJECTION (ML) INTRAVENOUS
Status: COMPLETED | OUTPATIENT
Start: 2023-03-10 | End: 2023-03-10

## 2023-03-10 RX ORDER — GLYCOPYRROLATE 1 MG/5 ML
SYRINGE (ML) INTRAVENOUS PRN
Status: DISCONTINUED | OUTPATIENT
Start: 2023-03-10 | End: 2023-03-10 | Stop reason: SDUPTHER

## 2023-03-10 RX ADMIN — NEOSTIGMINE METHYLSULFATE 3 MG: 1 INJECTION, SOLUTION INTRAVENOUS at 11:07

## 2023-03-10 RX ADMIN — ONDANSETRON 4 MG: 2 INJECTION INTRAMUSCULAR; INTRAVENOUS at 11:03

## 2023-03-10 RX ADMIN — SODIUM CHLORIDE, POTASSIUM CHLORIDE, SODIUM LACTATE AND CALCIUM CHLORIDE: 600; 310; 30; 20 INJECTION, SOLUTION INTRAVENOUS at 08:44

## 2023-03-10 RX ADMIN — DIMENHYDRINATE 50 MG: 50 TABLET ORAL at 08:39

## 2023-03-10 RX ADMIN — FENTANYL CITRATE 50 MCG: 50 INJECTION, SOLUTION INTRAMUSCULAR; INTRAVENOUS at 11:41

## 2023-03-10 RX ADMIN — DEXAMETHASONE SODIUM PHOSPHATE 10 MG: 10 INJECTION INTRAMUSCULAR; INTRAVENOUS at 09:35

## 2023-03-10 RX ADMIN — ROPIVACAINE HYDROCHLORIDE 34 ML: 5 INJECTION EPIDURAL; INFILTRATION; PERINEURAL at 09:35

## 2023-03-10 RX ADMIN — FENTANYL CITRATE 50 MCG: 50 INJECTION INTRAMUSCULAR; INTRAVENOUS at 09:26

## 2023-03-10 RX ADMIN — METOCLOPRAMIDE 5 MG: 5 INJECTION, SOLUTION INTRAMUSCULAR; INTRAVENOUS at 11:38

## 2023-03-10 RX ADMIN — ACETAMINOPHEN 650 MG: 325 TABLET ORAL at 08:39

## 2023-03-10 RX ADMIN — DEXAMETHASONE SODIUM PHOSPHATE 4 MG: 4 INJECTION, SOLUTION INTRAMUSCULAR; INTRAVENOUS at 11:03

## 2023-03-10 RX ADMIN — Medication 2000 MG: at 10:28

## 2023-03-10 RX ADMIN — FENTANYL CITRATE 50 MCG: 50 INJECTION INTRAMUSCULAR; INTRAVENOUS at 10:35

## 2023-03-10 RX ADMIN — ROCURONIUM BROMIDE 30 MG: 10 INJECTION, SOLUTION INTRAVENOUS at 10:35

## 2023-03-10 RX ADMIN — MIDAZOLAM 2 MG: 1 INJECTION INTRAMUSCULAR; INTRAVENOUS at 09:26

## 2023-03-10 RX ADMIN — Medication 0.4 MG: at 11:07

## 2023-03-10 RX ADMIN — LIDOCAINE HYDROCHLORIDE 100 MG: 20 INJECTION, SOLUTION EPIDURAL; INFILTRATION; INTRACAUDAL; PERINEURAL at 10:35

## 2023-03-10 RX ADMIN — PROPOFOL 120 MG: 10 INJECTION, EMULSION INTRAVENOUS at 10:35

## 2023-03-10 RX ADMIN — SODIUM CHLORIDE 26 ML: 9 INJECTION, SOLUTION INTRAMUSCULAR; INTRAVENOUS; SUBCUTANEOUS at 09:35

## 2023-03-10 ASSESSMENT — PAIN DESCRIPTION - ORIENTATION
ORIENTATION: RIGHT;LEFT;LOWER
ORIENTATION: RIGHT;LEFT;LOWER

## 2023-03-10 ASSESSMENT — PAIN SCALES - GENERAL
PAINLEVEL_OUTOF10: 2
PAINLEVEL_OUTOF10: 7

## 2023-03-10 ASSESSMENT — PAIN DESCRIPTION - LOCATION
LOCATION: ABDOMEN
LOCATION: ABDOMEN

## 2023-03-10 ASSESSMENT — PAIN - FUNCTIONAL ASSESSMENT: PAIN_FUNCTIONAL_ASSESSMENT: NONE - DENIES PAIN

## 2023-03-10 ASSESSMENT — PAIN DESCRIPTION - PAIN TYPE: TYPE: SURGICAL PAIN

## 2023-03-10 ASSESSMENT — LIFESTYLE VARIABLES: SMOKING_STATUS: 0

## 2023-03-10 NOTE — DISCHARGE INSTRUCTIONS
SAME DAY SURGERY DISCHARGE INSTRUCTIONS    1. Do not drive or operate hazardous machinery for 24 hours. 2.  Do not make important personal or business decisions for 24 hours. 3.  Do not drink alcoholic beverages for 24 hours. 4.  Do not smoke tobacco products for 24 hours. 5.  Eat light foods and drink plenty of fluids up to 8 glasses per day, as you can tolerate. 6.  Limit your activities for 24 hours. Do not engage in heavy work until your surgeon gives you permission. 7.  Patient should not be left alone for 12-24 hours following surgical procedure. 8.  Wash hands before and after incision care. It is important to practice good personal hygiene during the post op period. 9.  Notify your doctor immediately of any of the following:    Excessive swelling of, or around the wound area. Redness. Temperature of 100 degrees (F) or above. Excessive pain. Unable to urinate or empty bladder 4-6 hours after surgery. Excessive bleeding at incision site. 10.  Call your surgeon for any questions regarding your surgery. POST-OPERATIVE INSTRUCTIONS     Activity as tolerated; may shower and change dressings as needed. Remove dressing after 24 hours. Leave steri-strips in place - they will fall of on their own. Pain medication to be taken as directed for discomfort. No sexual relations, douches, tampons, tub baths, swimming in ponds/pools, or hot tubs for 2 weeks or until seen by the doctor for your follow-up appointment. May have some vaginal drainage for 1-2 weeks, call if excessive. Call the office at [phone] ([place])  [phone] Alayna Vides) for an appointment in 2 weeks.

## 2023-03-10 NOTE — PROGRESS NOTES
Patient called at 1400 with questions regarding the amount of pain pills the pharmacy gave her.  in surgery at this time; informed patient that I would discuss with her and give the patient a call back.

## 2023-03-10 NOTE — PROGRESS NOTES

## 2023-03-10 NOTE — H&P
HISTORY AND PHYSICAL             Date: 3/10/2023        Patient Name: Darrian Beverly     YOB: 1980      Age:  38 y.o. Chief Complaint   No chief complaint on file. History Obtained From   patient    History of Present Illness   Pt presents with hx of menorrhagia and dysmenorrhea    Past Medical History     Past Medical History:   Diagnosis Date    Abnormal Pap smear of cervix     2003    Thyroid disease     hypothyroidism        Past Surgical History     Past Surgical History:   Procedure Laterality Date    MOLE REMOVAL          Medications Prior to Admission     Prior to Admission medications    Medication Sig Start Date End Date Taking? Authorizing Provider   acetaminophen-codeine (TYLENOL/CODEINE #3) 300-30 MG per tablet Take 1 tablet by mouth every 6 hours as needed for Pain for up to 5 days. Intended supply: 5 days. Take lowest dose possible to manage pain Max Daily Amount: 4 tablets 3/10/23 3/15/23 Yes Andrea Young PA-C   levothyroxine (SYNTHROID) 75 MCG tablet Take 75 mcg by mouth Daily   Yes Historical Provider, MD   UBRELVY 100 MG TABS TAKE ONE TABLET BY MOUTH AT ONSET OF MIGRAINE. MAY REPEAT IN ONE HOUR.  MAX TWO TABLETS PER DAY. 1/13/23   Historical Provider, MD   MULTIPLE VITAMINS PO Take by mouth    Historical Provider, MD   traZODone (DESYREL) 50 MG tablet as needed 7/1/22   Historical Provider, MD        Allergies   Azithromycin, Ibuprofen, Penicillins, Sulfa antibiotics, and Nickel    Social History     Social History       Tobacco History       Smoking Status  Former Smoking Tobacco Type  Cigarettes      Smokeless Tobacco Use  Never              Alcohol History       Alcohol Use Status  Yes Comment  rare              Drug Use       Drug Use Status  Never              Sexual Activity       Sexually Active  Yes Partners  Male Birth Control/Protection  OCP                    Family History     Family History   Problem Relation Age of Onset    Breast Cancer Paternal Grandmother     Hypertension Maternal Grandmother     Hypertension Mother        Review of Systems   Review of Systems   Constitutional:  Negative for chills and fever. Genitourinary:  Positive for menstrual problem and pelvic pain. Negative for dysuria and vaginal discharge. Physical Exam   /79   Pulse 68   Temp 98 °F (36.7 °C) (Temporal)   Resp 14   Ht 5' 2\" (1.575 m)   Wt 125 lb (56.7 kg)   LMP 02/19/2023   SpO2 98%   BMI 22.86 kg/m²     Physical Exam  Constitutional:       Appearance: Normal appearance. HENT:      Head: Normocephalic and atraumatic. Eyes:      Extraocular Movements: Extraocular movements intact. Pupils: Pupils are equal, round, and reactive to light. Cardiovascular:      Rate and Rhythm: Normal rate. Pulmonary:      Effort: Pulmonary effort is normal.   Musculoskeletal:         General: Normal range of motion. Cervical back: Normal range of motion. Skin:     General: Skin is warm and dry. Neurological:      Mental Status: She is alert and oriented to person, place, and time. Psychiatric:         Mood and Affect: Mood normal.         Behavior: Behavior normal.         Thought Content: Thought content normal.         Judgment: Judgment normal.       Labs    No results found for this or any previous visit (from the past 24 hour(s)). Imaging/Diagnostics Last 24 Hours   No results found.     Assessment      Menorrhagia  Dysmenorrhea    Plan   Hysteroscopy D&C Zoran with medically indicated bilateral slapingectomy    Consultations Ordered:  None    Electronically signed by Jt Tripp DO on 3/10/23 at 10:07 AM EST

## 2023-03-10 NOTE — ANESTHESIA PRE PROCEDURE
Department of Anesthesiology  Preprocedure Note       Name:  Nadiya Mendez   Age:  38 y.o.  :  1980                                          MRN:  [id number]         Date:  3/10/2023      Surgeon: Donald Allen):  Jose Ramon Ibrahim DO    Procedure: Procedure(s):  SALPINGECTOMY LAPAROSCOPIC  DILATATION AND CURETTAGE HYSTEROSCOPY CAUTERY ABLATION- Lona Lipa    Medications prior to admission:   Prior to Admission medications    Medication Sig Start Date End Date Taking? Authorizing Provider   levothyroxine (SYNTHROID) 75 MCG tablet Take 75 mcg by mouth Daily   Yes Historical Provider, MD   UBRELVY 100 MG TABS TAKE ONE TABLET BY MOUTH AT ONSET OF MIGRAINE. MAY REPEAT IN ONE HOUR. MAX TWO TABLETS PER DAY. 23   Historical Provider, MD   MULTIPLE VITAMINS PO Take by mouth    Historical Provider, MD   traZODone (DESYREL) 50 MG tablet as needed 22   Historical Provider, MD   norethindrone (ORTHO MICRONOR) 0.35 MG tablet Take 1 tablet by mouth daily 22   ANJU Leon CNM       Current medications:    Current Facility-Administered Medications   Medication Dose Route Frequency Provider Last Rate Last Admin    sodium chloride flush 0.9 % injection 5-40 mL  5-40 mL IntraVENous 2 times per day Frann Paget, PA-C        sodium chloride flush 0.9 % injection 5-40 mL  5-40 mL IntraVENous PRN Frann Paget, PA-C        0.9 % sodium chloride infusion   IntraVENous PRN Frann Paget, PA-C        ceFAZolin (ANCEF) 2000 mg in 0.9% sodium chloride 100 mL IVPB  2,000 mg IntraVENous On Call to Mony Jefferson PA-C        acetaminophen (TYLENOL) tablet 650 mg  650 mg Oral Once Roberto Cecilia, APRN - CRNA        dimenhyDRINATE (DRAMAMINE) tablet 50 mg  50 mg Oral Once Roberto Cecilia, APRN - CRNA        lactated ringers IV soln infusion   IntraVENous Continuous Robetro Cecilia, APRN - CRNA           Allergies:     Allergies   Allergen Reactions    Azithromycin     Ibuprofen  Penicillins     Sulfa Antibiotics     Nickel Rash       Problem List:  There is no problem list on file for this patient. Past Medical History:        Diagnosis Date    Abnormal Pap smear of cervix     2003    Thyroid disease     hypothyroidism       Past Surgical History:        Procedure Laterality Date    MOLE REMOVAL         Social History:    Social History     Tobacco Use    Smoking status: Former     Types: Cigarettes    Smokeless tobacco: Never   Substance Use Topics    Alcohol use: Yes     Comment: rare                                Counseling given: Not Answered      Vital Signs (Current):   Vitals:    02/28/23 1501   Weight: 125 lb (56.7 kg)   Height: 5' 2\" (1.575 m)                                              BP Readings from Last 3 Encounters:   03/08/23 104/62   11/22/22 110/62   11/03/22 110/64       NPO Status: Time of last liquid consumption: 2100                        Time of last solid consumption: 2000                        Date of last liquid consumption: 03/09/23                        Date of last solid food consumption: 03/09/23    BMI:   Wt Readings from Last 3 Encounters:   02/28/23 125 lb (56.7 kg)   03/08/23 128 lb (58.1 kg)   11/22/22 126 lb 12.8 oz (57.5 kg)     Body mass index is 22.86 kg/m². CBC:   Lab Results   Component Value Date/Time    WBC 6.4 03/08/2023 09:42 AM    RBC 4.10 03/08/2023 09:42 AM    HGB 12.4 03/08/2023 09:42 AM    HCT 38.8 03/08/2023 09:42 AM    MCV 94.6 03/08/2023 09:42 AM    RDW 12.6 03/08/2023 09:42 AM     03/08/2023 09:42 AM       CMP: No results found for: NA, K, CL, CO2, BUN, CREATININE, GFRAA, AGRATIO, LABGLOM, GLUCOSE, GLU, PROT, CALCIUM, BILITOT, ALKPHOS, AST, ALT    POC Tests: No results for input(s): POCGLU, POCNA, POCK, POCCL, POCBUN, POCHEMO, POCHCT in the last 72 hours.     Coags: No results found for: PROTIME, INR, APTT    HCG (If Applicable):   Lab Results   Component Value Date    HCGQUANT <1 03/08/2023        ABGs: No results found for: PHART, PO2ART, TLL6APM, HCL0MSS, BEART, T5YXDEBR     Type & Screen (If Applicable):  No results found for: LABABO, LABRH    Drug/Infectious Status (If Applicable):  No results found for: HIV, HEPCAB    COVID-19 Screening (If Applicable): No results found for: COVID19        Anesthesia Evaluation  Patient summary reviewed and Nursing notes reviewed no history of anesthetic complications:   Airway: Mallampati: I  TM distance: >3 FB   Neck ROM: full  Mouth opening: > = 3 FB   Dental: normal exam         Pulmonary:Negative Pulmonary ROS and normal exam  breath sounds clear to auscultation      (-) not a current smoker                           Cardiovascular:Negative CV ROS  Exercise tolerance: good (>4 METS),           Rhythm: regular  Rate: normal           Beta Blocker:  Not on Beta Blocker         Neuro/Psych:   (+) headaches: migraine headaches,             GI/Hepatic/Renal: Neg GI/Hepatic/Renal ROS            Endo/Other:    (+) hypothyroidism::., .                 Abdominal:             Vascular: negative vascular ROS. Other Findings:           Anesthesia Plan      general     ASA 1           MIPS: Postoperative opioids intended and Prophylactic antiemetics administered. Anesthetic plan and risks discussed with patient and spouse. Use of blood products discussed with patient and spouse whom consented to blood products. Plan discussed with CRNA.           Post-op pain plan if not by surgeon: single peripheral nerve block            Gradie Rinne, APRN - CRNA   3/10/2023

## 2023-03-10 NOTE — PROGRESS NOTES
Called patient back at 1600 after talking to  regarding the number of pain pills prescribed. Patient was still kind of skeptical about not having enough to get through the weekend. Discussed with her that hopefully she wouldn't need the max every day for the next five days. Relayed that  was confident that she would only need a couple to get through the first day or two. Also told patient that she may call office numbers, even on the weekend if she has any issues and they can can whoever is on call.

## 2023-03-10 NOTE — ANESTHESIA PROCEDURE NOTES
Peripheral Block    Patient location during procedure: pre-op  Reason for block: post-op pain management and at surgeon's request  Start time: 3/10/2023 9:26 AM  End time: 3/10/2023 9:35 AM  Staffing  Performed: resident/CRNA and other anesthesia staff   Resident/CRNA: ANJU Nobles - CRNA  Other anesthesia staff: Farrukh Akhtar RN  Preanesthetic Checklist  Completed: patient identified, IV checked, site marked, risks and benefits discussed, surgical/procedural consents, equipment checked, pre-op evaluation, timeout performed, anesthesia consent given, oxygen available, monitors applied/VS acknowledged and blood product R/B/A discussed and consented  Peripheral Block   Patient position: supine  Prep: ChloraPrep  Provider prep: mask and sterile gloves  Patient monitoring: continuous pulse ox, IV access and responsive to questions (cardiac monitor, NIBP, and oxygen readily available)  Block type: Rectus sheath and TAP  Laterality: bilateral  Injection technique: single-shot  Guidance: ultrasound guided  Local infiltration: ropivacaine and decadron  Infiltration strength: 0.5 %  Local infiltration: ropivacaine and decadron  Dose: 34 mLDose: 1 mL    Needle   Needle type:  Other   Needle gauge: 22 G  Needle localization: ultrasound guidance  Needle length: 8 cmOther needle type: [place] TAP  Assessment   Injection assessment: negative aspiration for heme, no paresthesia on injection, local visualized surrounding nerve on ultrasound, no intravascular symptoms and low pressure verified by pressure monitor  Paresthesia pain: none  Slow fractionated injection: yes  Hemodynamics: stable  Real-time US image taken/store: yes  Outcomes: uncomplicated and patient tolerated procedure well    Additional Notes  22 mL 0.5% ropivacaine- 18 mL PFNS- 6.67 mg decadron divided evenly between bilateral TAP blocks  12 mL 0.5% ropivacaine- 8 mL PFNS- 3.33 mg decadrondivided evenly between bilateral Rectus Sheath blocks

## 2023-03-10 NOTE — OP NOTE
Preoperative diagnosis: Menorrhagia     Postoperative diagnosis: Same     Procedure: Hysteroscopy D&C Novasure endometrial ablation with medically indicated laparoscopic bilateral salpingectomy    Surgeon:  Dr. Mansoor Chavez    Assistant: Nayeli Ashford PGY 4    Anesthesia:  general    EBL:  10 mL    Fluids:  as per anesthesia    Condition:  stable    Complications:  none    Speciamens:  Bilateral tubes, endometrial currettings    Findings:  Grossly normal uterus, tubes, and ovaries. The uterus sounded to 8 cm in depth. The uterine cavity was 4 cm deep and 3.6 cm wide. Description of Procedure: The patient was moved to the operative suite where she was placed under general anesthesia without difficulty. Time out was preformed. The patient was placed in the dorsal lithotomy position utilizing [employer] The Positioning was checked without stress or pressure on any joints. Her bladder was drained with a sequeira catheter. She was prepped and draped in sterile fashion. The uterus was sounded to 8.5 cm and a  kronner manipulator was placed. Gloves were then changed and attention was turned to the abdomen. A 5 millimeter infraumbilical skin incision was made. A Veress needle was carefully introduced at a 45 degree angle while tenting the abdominal wall. Intra-abdominal placement was confirmed by use of a water-filled syringe and drop in intra-abdominal pressure with insufflation of CO2. Pneumoperitoneum was obtained with about 2-1/2 L of CO2. A 5 mm step port was then placed without difficulty and intra-abdominal placement was confirmed by laparoscopy. Inspection of the patient's pelvis revealed the findings noted above and underlying structures were noted to be without trauma. Under direct visualization at 5 mm trocar was placed medial to the left ASIS and an 8 mm trocar was placed medial to the right ASIS.     Both fallopian tubes were traced from their cornual insertion to the fimbriated ends.  The uterus was deviated laterally  with the manipulator to gain access to the bilateral fallopian tubes. The fallopian tube on the left was mobilized and grasped. The mesosalpinx was tented and utilizing the LigaSure in a stepwise fashion it was ligated and transected along the entire length of the fallopian tube. The isthmus of the tube was then ligated and transected in a similar manner. Hemostasis was maintained throughout. The fallopian tube was removed through the port. Excellent hemostasis was achieved. The same procedure was completed on the contralateral side. Re-inspection of the bilateral mesenteric edges and tubal stumps were hemostatic. The bilateral tubes were sent to pathology. All instruments and excess gas was removed from the abdomen and the trocars were removed without difficulty. The skin incisions were closed with 4-0 vicryl in a subcuticular fashion and dressed with steri-strips and a sterile dressing. A weighted speculum was placed along the posterior vaginal wall and the  cervix was visualized. The uterine manipulator was removed. The cervix was progressively dilated and a hysteroscope was introduced with a proliferative lining being noted. The hysteroscope was then withdrawn and a sharp curettage was performed to sample the endometrium. The cervix was dilated to a # 21 and the NovaSure array was introduced. A seating technique was utilized to find the overall cavity width. A CO2 test was performed and passed. The ablation was then carried out at  for 120 seconds. At the completion of the ablation the array was withdrawn into its protective sheath and removed from the uterus. The tenaculum was released with hemostasis being noted and the weighted speculum was removed. The patient was awakened from anesthesia. The patient tolerated the procedure well and was taken to PACU in stable condition. All sponge, needle and instrument counts were correct x 2.

## 2023-03-10 NOTE — ANESTHESIA POSTPROCEDURE EVALUATION
Department of Anesthesiology  Postprocedure Note    Patient: Nadiya Mendez  MRN: [id number]  YOB: 1980  Date of evaluation: 3/10/2023      Procedure Summary     Date: 03/10/23 Room / Location: [address] / [address] [place] [address] [address]    Anesthesia Start: 1031 Anesthesia Stop: 1129    Procedures:       SALPINGECTOMY LAPAROSCOPIC (Bilateral)      DILATATION AND CURETTAGE HYSTEROSCOPY CAUTERY ABLATION- Lona Lipa Diagnosis:       Menorrhagia with regular cycle      (MENORRHAGIA, DYSMENORRHEA)    Surgeons: Jose Ramon Ibrahim DO Responsible Provider: ANJU Barr CRNA    Anesthesia Type: general ASA Status: 1          Anesthesia Type: No value filed.     Dot Phase I: Dot Score: 9    Dot Phase II: Dot Score: 10      Anesthesia Post Evaluation    Patient location during evaluation: PACU  Patient participation: complete - patient participated  Level of consciousness: awake  Airway patency: patent  Nausea & Vomiting: no vomiting and no nausea  Complications: no  Cardiovascular status: blood pressure returned to baseline and hemodynamically stable  Respiratory status: acceptable, spontaneous ventilation and room air  Hydration status: stable  Multimodal analgesia pain management approach

## 2023-03-14 LAB — SURGICAL PATHOLOGY REPORT: NORMAL

## 2023-03-28 ENCOUNTER — OFFICE VISIT (OUTPATIENT)
Dept: OBGYN CLINIC | Age: 43
End: 2023-03-28

## 2023-03-28 ENCOUNTER — HOSPITAL ENCOUNTER (OUTPATIENT)
Age: 43
Setting detail: SPECIMEN
Discharge: HOME | End: 2023-03-28

## 2023-03-28 VITALS — DIASTOLIC BLOOD PRESSURE: 78 MMHG | BODY MASS INDEX: 24.18 KG/M2 | WEIGHT: 132.2 LBS | SYSTOLIC BLOOD PRESSURE: 118 MMHG

## 2023-03-28 DIAGNOSIS — N89.8 VAGINAL DISCHARGE: ICD-10-CM

## 2023-03-28 DIAGNOSIS — N89.8 VAGINAL DISCHARGE: Primary | ICD-10-CM

## 2023-03-28 DIAGNOSIS — Z09 POSTOPERATIVE EXAMINATION: ICD-10-CM

## 2023-03-28 PROCEDURE — 99024 POSTOP FOLLOW-UP VISIT: CPT

## 2023-03-28 NOTE — PROGRESS NOTES
Postop Progress Note    Subjective    Shaun Samano presents to the office for postop follow up. Chief Complaint   Patient presents with    Post-Op Check     Here for post op visit. Had ablation on 3/10/23. Having small amount vaginal discharge of older blood. Objective    Vitals:    03/28/23 0923   BP: 118/78     General: alert, cooperative and no distress  Incision: healing well    Assessment  Doing well postoperatively. Reports that she has ongoing yellowish-brown discharge. Denies itching, irritation. Agreeable to vaginal cx to rule out infection. Denies pelvic pain, bleeding. Plan  1. Continue any current medications  2. Wound care discussed  3. Pt is to increase activities as tolerated  4. Usual diet  5.  Follow up: annual - July    Electronically signed by Donna Kurtz PA-C on 3/28/2023 at 10:28 AM

## 2023-03-29 LAB
CANDIDA SPECIES, DNA PROBE: NEGATIVE
GARDNERELLA VAGINALIS, DNA PROBE: POSITIVE
SOURCE: ABNORMAL
TRICHOMONAS VAGINALIS DNA: NEGATIVE

## 2023-03-29 RX ORDER — METRONIDAZOLE 7.5 MG/G
1 GEL VAGINAL DAILY
Qty: 70 G | Refills: 0 | Status: SHIPPED | OUTPATIENT
Start: 2023-03-29 | End: 2023-04-03

## 2023-04-03 ENCOUNTER — TELEPHONE (OUTPATIENT)
Dept: OBGYN CLINIC | Age: 43
End: 2023-04-03

## 2023-04-03 NOTE — TELEPHONE ENCOUNTER
Pt called stating she is still having vaginal itching despite the completion of metrogel. Pt wondering if she can have another script sent or if needs to come back for another visit.

## 2023-04-04 RX ORDER — METRONIDAZOLE 7.5 MG/G
1 GEL VAGINAL DAILY
Qty: 70 G | Refills: 0 | Status: SHIPPED | OUTPATIENT
Start: 2023-04-04 | End: 2023-04-09

## 2023-04-04 RX ORDER — FLUCONAZOLE 150 MG/1
150 TABLET ORAL ONCE
Qty: 1 TABLET | Refills: 0 | Status: SHIPPED | OUTPATIENT
Start: 2023-04-04 | End: 2023-04-04

## 2023-04-04 NOTE — TELEPHONE ENCOUNTER
Possibly an allergic reaction but unable to know for sure - I would like her to see PCP for rash to see if they think allergic reaction or not. Lets hold on a script until has rash evaluated.   I am sorry

## 2023-04-04 NOTE — TELEPHONE ENCOUNTER
Spoke with patient and she states she has developed an all over rash with itchy bumps on her torso and sides. Pt wondering if there is something else she should take or just wait.

## 2023-07-13 ENCOUNTER — TELEPHONE (OUTPATIENT)
Dept: OBGYN CLINIC | Age: 43
End: 2023-07-13

## (undated) DEVICE — GLOVE SURG SZ 65 L12IN FNGR THK87MIL WHT LTX FREE

## (undated) DEVICE — SOLUTION IV IRRIG POUR BRL 0.9% SODIUM CHL 2F7124

## (undated) DEVICE — ELECTRODE ES AD CRD L15FT DISP FOR PT BELOW 30LB REM

## (undated) DEVICE — 1000ML,PRESSURE INFUSER W/STOPCOCK: Brand: MEDLINE

## (undated) DEVICE — SUTURE MCRYL SZ 4-0 L27IN ABSRB UD L19MM PS-2 1/2 CIR PRIM Y426H

## (undated) DEVICE — SOLUTION SURG PREP ANTIMICROBIAL 4 OZ SKIN WND EXIDINE

## (undated) DEVICE — TROCAR: Brand: KII FIOS FIRST ENTRY

## (undated) DEVICE — 3M™ TEGADERM™ +PAD FILM DRESSING WITH NON-ADHERENT PAD, 3587, 3-1/2 IN X 4-1/8 IN (9 CM X 10.5 CM), 25/CAR, 4 CAR/CS: Brand: 3M™ TEGADERM™

## (undated) DEVICE — CATHETER URETH 16FR L16IN RED RUB INTMIT ROB MOD BARDX

## (undated) DEVICE — LAVH-LF: Brand: MEDLINE INDUSTRIES, INC.

## (undated) DEVICE — STRIP,CLOSURE,WOUND,MEDI-STRIP,1/2X4: Brand: MEDLINE

## (undated) DEVICE — MAGNETIC IMPLANT S1000-00: Brand: SOPHONO™

## (undated) DEVICE — PREP PAD BNS: Brand: CONVERTORS

## (undated) DEVICE — PACK,LITHOTOMY: Brand: MEDLINE

## (undated) DEVICE — SEALER REPROC VES 37 CM MARYLAND JAW LIGASURE

## (undated) DEVICE — HYPODERMIC SAFETY NEEDLE: Brand: MAGELLAN

## (undated) DEVICE — SEALER LAP L37CM MARYLAND JAW OPN NANO COAT MULTIFUNCTIONAL

## (undated) DEVICE — SUTURE VCRL SZ 2-0 L27IN ABSRB VLT L26MM UR-6 5/8 CIR J602H

## (undated) DEVICE — PAD N ADH W3XL4IN POLY COT SFT PERF FLM EASILY CUT ABSRB

## (undated) DEVICE — GOWN,AURORA,NONRNF,XL,30/CS: Brand: MEDLINE

## (undated) DEVICE — PAD,SANITARY,11 IN,MAXI,N-STRL,IND WRAP: Brand: MEDLINE

## (undated) DEVICE — [HIGH FLOW INSUFFLATOR,  DO NOT USE IF PACKAGE IS DAMAGED,  KEEP DRY,  KEEP AWAY FROM SUNLIGHT,  PROTECT FROM HEAT AND RADIOACTIVE SOURCES.]: Brand: PNEUMOSURE

## (undated) DEVICE — GOWN,SIRUS,POLYRNF,BRTHSLV,XL,30/CS: Brand: MEDLINE

## (undated) DEVICE — SOLUTION IV 1000ML 0.9% SOD CHL FOR IRRIG PLAS CONT

## (undated) DEVICE — PREMIUM DRY TRAY LF: Brand: MEDLINE INDUSTRIES, INC.

## (undated) DEVICE — KIT THERMOABLATION 6MM ENDOMET DEV NOVASURE - SEE COMMENT

## (undated) DEVICE — SUTURE PERMAHAND SZ 3-0 L30IN NONABSORBABLE BLK SH L26MM K832H

## (undated) DEVICE — COVER LT HNDL BLU PLAS

## (undated) DEVICE — DRAPE,UTILTY,TAPE,15X26, 4EA/PK: Brand: MEDLINE

## (undated) DEVICE — SUTURE VCRL SZ 3-0 L27IN ABSRB UD L26MM SH 1/2 CIR J416H

## (undated) DEVICE — GAUZE,SPONGE,4"X4",16PLY,XRAY,STRL,LF: Brand: MEDLINE

## (undated) DEVICE — UNDERPANTS INCONT XL 45-70IN KNIT SEAMLESS DSGN COLOR-CODED

## (undated) DEVICE — DRAPE SURG LITH HYSTSCP D AND C STD N FEN N REINF W FLD PCH

## (undated) DEVICE — TROCAR ENDOSCP L110MM DIA5MM STD CANN DIL BLDELSS BLNT TIP

## (undated) DEVICE — ANCHOR TISSUE RETRIEVAL SYSTEM, BAG SIZE 125 ML, PORT SIZE 8 MM: Brand: ANCHOR TISSUE RETRIEVAL SYSTEM

## (undated) DEVICE — Y-TYPE TUR/BLADDER IRRIGATION SET, REGULATING CLAMP

## (undated) DEVICE — WARMER SCP 2 ANTIFOG LAP DISP

## (undated) DEVICE — TOWEL,OR,DSP,ST,BLUE,STD,4/PK,20PK/CS: Brand: MEDLINE

## (undated) DEVICE — BLADELESS OBTURATOR: Brand: WECK VISTA

## (undated) DEVICE — SYRINGE MED 10ML LUERLOCK TIP W/O SFTY DISP